# Patient Record
Sex: MALE
[De-identification: names, ages, dates, MRNs, and addresses within clinical notes are randomized per-mention and may not be internally consistent; named-entity substitution may affect disease eponyms.]

---

## 2019-03-20 ENCOUNTER — HOSPITAL ENCOUNTER (EMERGENCY)
Dept: HOSPITAL 31 - C.ER | Age: 29
LOS: 1 days | Discharge: HOME | End: 2019-03-21
Payer: SELF-PAY

## 2019-03-20 DIAGNOSIS — F10.129: Primary | ICD-10-CM

## 2019-03-20 NOTE — C.PDOC
History Of Present Illness





44 year old male brought in by EMS for evaluation, was found intoxicated in 

public. History currently limited due to alcohol intoxication.


Time Seen by Provider: 03/20/19 22:59


Chief Complaint (Nursing): Substance Abuse


History Per: Patient, EMS


History/Exam Limitations: intoxication


Onset/Duration Of Symptoms: Unknown


Current Symptoms Are (Timing): Still Present


Modifying Factor(s): Alcohol


Severity: Moderate


Involuntary Hold By: Emergency Physician





Past Medical History


Reviewed: Historical Data, Nursing Documentation, Vital Signs


Vital Signs: 





                                Last Vital Signs











Temp  97.1 F L  03/20/19 22:57


 


Pulse  86   03/20/19 22:57


 


Resp  20   03/20/19 22:57


 


BP  115/88   03/20/19 22:57


 


Pulse Ox  96   03/20/19 22:57











Family History: States: Unknown Family Hx





- Social History


Hx Alcohol Use: Yes


Hx Substance Use: No (unknown)





Review Of Systems


Review Of Systems: ROS cannot be obtained secondary to pt's inabilty to answer 

questions. (alcohol intoxication)





Physical Exam





- Physical Exam


Appears: Non-toxic, Other (ETOH on breath, appears intoxicated)


Skin: Normal Color, Warm, Dry


Head: Atraumatic, Normacephalic


Eye(s): bilateral: Normal Inspection, PERRL, EOMI


Oral Mucosa: Moist


Neck: No Midline Cervical Tenderness, No Paracervical Tenderness, No Step Off 

Deformity, Supple


Cardiovascular: Rhythm Regular


Respiratory: Normal Breath Sounds, No Rales, No Rhonchi, No Wheezing


Gastrointestinal/Abdominal: Normal Exam, Bowel Sounds, Soft, No Tenderness


Extremity: No Deformity


Extremity: Bilateral: Atraumatic, Normal Color And Temperature, Normal ROM


Neurological/Psych: Other (intoxicated, arousable to verbal stimuli, able to 

follow commands, moving all 4 extremities spontaneously)





ED Course And Treatment


O2 Sat by Pulse Oximetry: 96 (Room air)


Pulse Ox Interpretation: Normal


Progress Note: Patient pending sobriety.  2:35- Patient arousable to verbal 

stimuli, in no acute distress.  Pending sobriety.


Reevaluation Time: 06:00


Reassessment Condition: Improved (Patient is currently AAOx3, ambulating 

normally in the ED.  He is clinically sober, will discharge.)





Disposition





- Disposition


Referrals: 


[facility] [Outside]


Disposition: HOME/ ROUTINE


Disposition Time: 06:00


Condition: STABLE


Instructions:  Alcohol Abuse and Alcoholism (DC)


Forms:  [facility] (English)


Print Language: Italian





- Clinical Impression


Clinical Impression: 


 Alcohol intoxication








- Scribe Statement


The provider has reviewed the documentation as recorded by the Scribhussain Melara





All medical record entries made by the Scribe were at my direction and 

personally dictated by me. I have reviewed the chart and agree that the record 

accurately reflects my personal performance of the history, physical exam, 

medical decision making, and the department course for this patient. I have also

 personally directed, reviewed, and agree with the discharge instructions and 

disposition.

## 2019-03-21 VITALS
RESPIRATION RATE: 18 BRPM | DIASTOLIC BLOOD PRESSURE: 76 MMHG | HEART RATE: 101 BPM | TEMPERATURE: 98.1 F | SYSTOLIC BLOOD PRESSURE: 111 MMHG

## 2019-03-31 VITALS — OXYGEN SATURATION: 96 %
